# Patient Record
Sex: FEMALE | Race: BLACK OR AFRICAN AMERICAN | Employment: FULL TIME | URBAN - METROPOLITAN AREA
[De-identification: names, ages, dates, MRNs, and addresses within clinical notes are randomized per-mention and may not be internally consistent; named-entity substitution may affect disease eponyms.]

---

## 2017-03-01 ENCOUNTER — OFFICE VISIT (OUTPATIENT)
Dept: FAMILY MEDICINE CLINIC | Facility: CLINIC | Age: 37
End: 2017-03-01

## 2017-03-01 VITALS
BODY MASS INDEX: 34.84 KG/M2 | DIASTOLIC BLOOD PRESSURE: 78 MMHG | HEIGHT: 67 IN | TEMPERATURE: 98.3 F | RESPIRATION RATE: 16 BRPM | WEIGHT: 222 LBS | SYSTOLIC BLOOD PRESSURE: 128 MMHG | HEART RATE: 87 BPM | OXYGEN SATURATION: 97 %

## 2017-03-01 DIAGNOSIS — G56.21 ULNAR NEUROPATHY AT ELBOW OF RIGHT UPPER EXTREMITY: ICD-10-CM

## 2017-03-01 DIAGNOSIS — G56.01 CARPAL TUNNEL SYNDROME OF RIGHT WRIST: ICD-10-CM

## 2017-03-01 DIAGNOSIS — Z01.419 WELL WOMAN EXAM WITH ROUTINE GYNECOLOGICAL EXAM: Primary | ICD-10-CM

## 2017-03-01 DIAGNOSIS — Z11.3 SCREENING FOR STD (SEXUALLY TRANSMITTED DISEASE): ICD-10-CM

## 2017-03-01 DIAGNOSIS — Z12.4 SCREENING FOR MALIGNANT NEOPLASM OF CERVIX: ICD-10-CM

## 2017-03-01 NOTE — PATIENT INSTRUCTIONS
Well Visit, Ages 25 to 48: Care Instructions  Your Care Instructions  Physical exams can help you stay healthy. Your doctor has checked your overall health and may have suggested ways to take good care of yourself. He or she also may have recommended tests. At home, you can help prevent illness with healthy eating, regular exercise, and other steps. Follow-up care is a key part of your treatment and safety. Be sure to make and go to all appointments, and call your doctor if you are having problems. It's also a good idea to know your test results and keep a list of the medicines you take. How can you care for yourself at home? · Reach and stay at a healthy weight. This will lower your risk for many problems, such as obesity, diabetes, heart disease, and high blood pressure. · Get at least 30 minutes of physical activity on most days of the week. Walking is a good choice. You also may want to do other activities, such as running, swimming, cycling, or playing tennis or team sports. Discuss any changes in your exercise program with your doctor. · Do not smoke or allow others to smoke around you. If you need help quitting, talk to your doctor about stop-smoking programs and medicines. These can increase your chances of quitting for good. · Talk to your doctor about whether you have any risk factors for sexually transmitted infections (STIs). Having one sex partner (who does not have STIs and does not have sex with anyone else) is a good way to avoid these infections. · Use birth control if you do not want to have children at this time. Talk with your doctor about the choices available and what might be best for you. · Protect your skin from too much sun. When you're outdoors from 10 a.m. to 4 p.m., stay in the shade or cover up with clothing and a hat with a wide brim. Wear sunglasses that block UV rays. Even when it's cloudy, put broad-spectrum sunscreen (SPF 30 or higher) on any exposed skin.   · See a dentist one or two times a year for checkups and to have your teeth cleaned. · Wear a seat belt in the car. · Drink alcohol in moderation, if at all. That means no more than 2 drinks a day for men and 1 drink a day for women. Follow your doctor's advice about when to have certain tests. These tests can spot problems early. For everyone  · Cholesterol. Have the fat (cholesterol) in your blood tested after age 21. Your doctor will tell you how often to have this done based on your age, family history, or other things that can increase your risk for heart disease. · Blood pressure. Have your blood pressure checked during a routine doctor visit. Your doctor will tell you how often to check your blood pressure based on your age, your blood pressure results, and other factors. · Vision. Talk with your doctor about how often to have a glaucoma test.  · Diabetes. Ask your doctor whether you should have tests for diabetes. · Colon cancer. Have a test for colon cancer at age 48. You may have one of several tests. If you are younger than 48, you may need a test earlier if you have any risk factors. Risk factors include whether you already had a precancerous polyp removed from your colon or whether your parent, brother, sister, or child has had colon cancer. For women  · Breast exam and mammogram. Talk to your doctor about when you should have a clinical breast exam and a mammogram. Medical experts differ on whether and how often women under 50 should have these tests. Your doctor can help you decide what is right for you. · Pap test and pelvic exam. Begin Pap tests at age 24. A Pap test is the best way to find cervical cancer. The test often is part of a pelvic exam. Ask how often to have this test.  · Tests for sexually transmitted infections (STIs). Ask whether you should have tests for STIs. You may be at risk if you have sex with more than one person, especially if your partners do not wear condoms.   For men  · Tests for sexually transmitted infections (STIs). Ask whether you should have tests for STIs. You may be at risk if you have sex with more than one person, especially if you do not wear a condom. · Testicular cancer exam. Ask your doctor whether you should check your testicles regularly. · Prostate exam. Talk to your doctor about whether you should have a blood test (called a PSA test) for prostate cancer. Experts differ on whether and when men should have this test. Some experts suggest it if you are older than 39 and are -American or have a father or brother who got prostate cancer when he was younger than 72. When should you call for help? Watch closely for changes in your health, and be sure to contact your doctor if you have any problems or symptoms that concern you. Where can you learn more? Go to http://brittany-randa.info/. Enter P072 in the search box to learn more about \"Well Visit, Ages 25 to 48: Care Instructions. \"  Current as of: July 19, 2016  Content Version: 11.1  © 3653-4464 [employer]. Care instructions adapted under license by [employer] (which disclaims liability or warranty for this information). If you have questions about a medical condition or this instruction, always ask your healthcare professional. [place] [address] any warranty or liability for your use of this information. Pap Test: Care Instructions  Your Care Instructions  The Pap test (also called a Pap smear) is a screening test for cancer of the cervix, which is the lower part of the uterus that opens into the vagina. The test can help your doctor find early changes in the cells that could lead to cancer. The sample of cells taken during your test has been sent to a lab so that an expert can look at the cells. It usually takes a week or two to get the results back. Follow-up care is a key part of your treatment and safety.  Be sure to make and go to all appointments, and call your doctor if you are having problems. It's also a good idea to know your test results and keep a list of the medicines you take. What do the results mean? · A normal result means that the test did not find any abnormal cells in the sample. · An abnormal result can mean many things. Most of these are not cancer. The results of your test may be abnormal because:  ¨ You have an infection of the vagina or cervix, such as a yeast infection. ¨ You have an IUD (intrauterine device for birth control). ¨ You have low estrogen levels after menopause that are causing the cells to change. ¨ You have cell changes that may be a sign of precancer or cancer. The results are ranked based on how serious the changes might be. There are many other reasons why you might not get a normal result. If the results were abnormal, you may need to get another test within a few weeks or months. If the results show changes that could be a sign of cancer, you may need a test called a colposcopy, which provides a more complete view of the cervix. Sometimes the lab cannot use the sample because it does not contain enough cells or was not preserved well. If so, you may need to have the test again. This is not common, but it does happen from time to time. When should you call for help? Watch closely for changes in your health, and be sure to contact your doctor if:  · You have vaginal bleeding or pain for more than 2 days after the test. It is normal to have a small amount of bleeding for a day or two after the test.  Where can you learn more? Go to http://brittany-randa.info/. Enter Y576 in the search box to learn more about \"Pap Test: Care Instructions. \"  Current as of: July 26, 2016  Content Version: 11.1  © 6798-9144 [employer]. Care instructions adapted under license by [employer] (which disclaims liability or warranty for this information).  If you have questions about a medical condition or this instruction, always ask your healthcare professional. [place] [address] any warranty or liability for your use of this information.

## 2017-03-01 NOTE — PROGRESS NOTES
Chief Complaint   Patient presents with    Well Woman    Complete Physical       Subjective:   40 y.o. female for Well Woman Check. No LMP recorded. Patient is not currently having periods (Reason: IUD). Social History: single partner, contraception - IUD. Pertinent past medical hstory: no history of HTN, DVT, CAD, DM, liver disease, migraines or smoking. Planning to see OB as Mirena is due to be changed  Mammogram being offered at job. Patient plans to get one. Hx of aunt and grandmother with breast cancer    Gets numbness and tingling at wrist and elbow; can occur at night with placing hands underneath chin or behind head. Other times irritated when cooking, doing hair or any/some repetitive motion. Not currently dropping things. Denies pain,f,c,n,v,d,c, muscle aches.     Patient Active Problem List   Diagnosis Code    Morbid obesity due to excess calories ([facility] [address] E66.01     Patient Active Problem List    Diagnosis Date Noted    Morbid obesity due to excess calories ([facility] 08/10/2016     Current Outpatient Prescriptions   Medication Sig Dispense Refill    levonorgestrel (MIRENA) 20 mcg/24 hr (5 years) IUD 1 Each by IntraUTERine route once.  HYDROcodone-acetaminophen (NORCO) 5-325 mg per tablet Take 1 Tab by mouth every four (4) hours as needed for Pain. Max Daily Amount: 6 Tabs. 12 Tab 0    triamcinolone acetonide (KENALOG) 0.1 % ointment Apply  to affected area two (2) times a day. use thin layer 30 g 0    Arm Brace (NEOPRENE WRIST SPLINT SUPPORT) misc Use during day as needed 1 Each 0    Arm Brace (ELBOW STRAP) misc Use nightly 1 Each 0     No Known Allergies  No past medical history on file. No past surgical history on file. Family History   Problem Relation Age of Onset    Diabetes Mother     Diabetes Father     Hypertension Father      Social History   Substance Use Topics    Smoking status: Former Smoker    Smokeless tobacco: Never Used    Alcohol use No        ROS:  Feeling well.  No dyspnea or chest pain on exertion. No abdominal pain, change in bowel habits, black or bloody stools. No urinary tract symptoms. GYN ROS: normal menses, no abnormal bleeding, pelvic pain or discharge, no breast pain or new or enlarging lumps on self exam. No neurological complaints. Objective:     Visit Vitals    /78    Pulse 87    Temp 98.3 °F (36.8 °C)    Resp 16    Ht 5' 7\" (1.702 m)    Wt 222 lb (100.7 kg)    SpO2 97%    BMI 34.77 kg/m2     The patient appears well, alert, oriented x 3, in no distress. ENT normal.  Neck supple. No adenopathy or thyromegaly. JAZMIN. Lungs are clear, good air entry, no wheezes, rhonchi or rales. S1 and S2 normal, no murmurs, regular rate and rhythm. Abdomen soft without tenderness, guarding, mass or organomegaly. Extremities show no edema, normal peripheral pulses. Neurological is normal, no focal findings. BREAST EXAM: breasts appear normal, no suspicious masses, no skin or nipple changes or axillary nodes    PELVIC EXAM: normal external genitalia, vulva, vagina, cervix, uterus and adnexa    Assessment/Plan:   well woman  no contraindication to continue hormonal therapy  mammogram  pap smear  counseled on breast self exam, mammography screening, STD prevention, HIV risk factors and prevention, family planning choices and adequate intake of calcium and vitamin D  return annually or prn  Atrice was seen today for well woman and complete physical.    Diagnoses and all orders for this visit:    Well woman exam with routine gynecological exam  Comments:  [L42.31]  Orders:  -     LIPID PANEL; Future  -     METABOLIC PANEL, COMPREHENSIVE; Future  -     CBC WITH AUTOMATED DIFF; Future  -     TSH 3RD GENERATION; Future    Screening for malignant neoplasm of cervix  -     PAP, LB, RFX HPV OABFI(712111); Future    Screening for STD (sexually transmitted disease)  -     RPR; Future  -     HIV 1/2 AG/AB, 4TH GENERATION,W RFLX CONFIRM;  Future  -     HEP B SURFACE AG; Future    Ulnar neuropathy at elbow of right upper extremity  -     REFERRAL TO PHYSICAL THERAPY  -     REFERRAL TO ORTHOPEDICS    Carpal tunnel syndrome of right wrist  -     REFERRAL TO PHYSICAL THERAPY  -     REFERRAL TO ORTHOPEDICS    Will send to PT and Ortho for persistent right wrist/hand neuropathy    I have discussed the diagnosis with the patient and the intended plan as seen in the above orders. The patient has received an after-visit summary and questions were answered concerning future plans. I have discussed medication side effects and warnings with the patient as well. I have reviewed the plan of care with the patient, accepted their input and they are in agreement with the treatment goals. Patient verbalizes understanding. Follow-up Disposition:  Return in about 1 year (around 3/1/2018). Mariaa Acuna

## 2017-03-01 NOTE — MR AVS SNAPSHOT
Visit Information Date & Time Provider Department Dept. Phone Encounter #  
 3/1/2017  8:30 AM Mal Mojica MD [facility] [phone] [id number] Follow-up Instructions Return in about 1 year (around 3/1/2018). Upcoming Health Maintenance Date Due DTaP/Tdap/Td series (1 - Tdap) 1/23/2001 PAP AKA CERVICAL CYTOLOGY 6/7/2014 INFLUENZA AGE 9 TO ADULT 8/1/2016 Allergies as of 3/1/2017  Review Complete On: 3/1/2017 By: Marilin Putnam No Known Allergies Current Immunizations  Never Reviewed No immunizations on file. Not reviewed this visit You Were Diagnosed With   
  
 Codes Comments Screening for STD (sexually transmitted disease)    -  Primary ICD-10-CM: Z11.3 ICD-9-CM: V74.5 Well woman exam with routine gynecological exam     ICD-10-CM: K14.191 ICD-9-CM: V72.31 [V72.31] Screening for malignant neoplasm of cervix     ICD-10-CM: Z12.4 ICD-9-CM: V76.2 Vitals BP  
  
  
  
  
  
 128/78 Vitals History BMI and BSA Data Body Mass Index Body Surface Area 34.77 kg/m 2 2.18 m 2 Preferred Pharmacy Pharmacy Name Phone [address] [facility], [address] Your Updated Medication List  
  
   
This list is accurate as of: 3/1/17  8:51 AM.  Always use your most recent med list.  
  
  
  
  
 * Arm Brace Misc Commonly known as:  NEOPRENE WRIST SPLINT SUPPORT Use during day as needed * Arm Brace Misc Commonly known as:  ELBOW STRAP Use nightly HYDROcodone-acetaminophen 5-325 mg per tablet Commonly known as:  Rich Rios Take 1 Tab by mouth every four (4) hours as needed for Pain. Max Daily Amount: 6 Tabs. MIRENA 20 mcg/24 hr (5 years) IUD Generic drug:  levonorgestrel 1 Each by IntraUTERine route once. triamcinolone acetonide 0.1 % ointment Commonly known as:  KENALOG Apply  to affected area two (2) times a day. use thin layer * Notice: This list has 2 medication(s) that are the same as other medications prescribed for you. Read the directions carefully, and ask your doctor or other care provider to review them with you. Follow-up Instructions Return in about 1 year (around 3/1/2018). To-Do List   
 03/01/2017 Lab:  CBC WITH AUTOMATED DIFF   
  
 03/01/2017 Lab:  HEP B SURFACE AG   
  
 03/01/2017 Lab:  HIV 1/2 AG/AB, 4TH GENERATION,W RFLX CONFIRM   
  
 03/01/2017 Lab:  LIPID PANEL   
  
 03/01/2017 Lab:  METABOLIC PANEL, COMPREHENSIVE   
  
 03/01/2017 Pathology:  PAP, LB, RFX HPV [id number])   
  
 03/01/2017 Lab:  RPR   
  
 03/01/2017 Lab:  TSH 3RD GENERATION Patient Instructions Well Visit, Ages 25 to 48: Care Instructions Your Care Instructions Physical exams can help you stay healthy. Your doctor has checked your overall health and may have suggested ways to take good care of yourself. He or she also may have recommended tests. At home, you can help prevent illness with healthy eating, regular exercise, and other steps. Follow-up care is a key part of your treatment and safety. Be sure to make and go to all appointments, and call your doctor if you are having problems. It's also a good idea to know your test results and keep a list of the medicines you take. How can you care for yourself at home? · Reach and stay at a healthy weight. This will lower your risk for many problems, such as obesity, diabetes, heart disease, and high blood pressure. · Get at least 30 minutes of physical activity on most days of the week. Walking is a good choice. You also may want to do other activities, such as running, swimming, cycling, or playing tennis or team sports. Discuss any changes in your exercise program with your doctor. · Do not smoke or allow others to smoke around you.  If you need help quitting, talk to your doctor about stop-smoking programs and medicines. These can increase your chances of quitting for good. · Talk to your doctor about whether you have any risk factors for sexually transmitted infections (STIs). Having one sex partner (who does not have STIs and does not have sex with anyone else) is a good way to avoid these infections. · Use birth control if you do not want to have children at this time. Talk with your doctor about the choices available and what might be best for you. · Protect your skin from too much sun. When you're outdoors from 10 a.m. to 4 p.m., stay in the shade or cover up with clothing and a hat with a wide brim. Wear sunglasses that block UV rays. Even when it's cloudy, put broad-spectrum sunscreen (SPF 30 or higher) on any exposed skin. · See a dentist one or two times a year for checkups and to have your teeth cleaned. · Wear a seat belt in the car. · Drink alcohol in moderation, if at all. That means no more than 2 drinks a day for men and 1 drink a day for women. Follow your doctor's advice about when to have certain tests. These tests can spot problems early. For everyone · Cholesterol. Have the fat (cholesterol) in your blood tested after age 21. Your doctor will tell you how often to have this done based on your age, family history, or other things that can increase your risk for heart disease. · Blood pressure. Have your blood pressure checked during a routine doctor visit. Your doctor will tell you how often to check your blood pressure based on your age, your blood pressure results, and other factors. · Vision. Talk with your doctor about how often to have a glaucoma test. 
· Diabetes. Ask your doctor whether you should have tests for diabetes. · Colon cancer. Have a test for colon cancer at age 48. You may have one of several tests.  If you are younger than 48, you may need a test earlier if you have any risk factors. Risk factors include whether you already had a precancerous polyp removed from your colon or whether your parent, brother, sister, or child has had colon cancer. For women · Breast exam and mammogram. Talk to your doctor about when you should have a clinical breast exam and a mammogram. Medical experts differ on whether and how often women under 50 should have these tests. Your doctor can help you decide what is right for you. · Pap test and pelvic exam. Begin Pap tests at age 24. A Pap test is the best way to find cervical cancer. The test often is part of a pelvic exam. Ask how often to have this test. 
· Tests for sexually transmitted infections (STIs). Ask whether you should have tests for STIs. You may be at risk if you have sex with more than one person, especially if your partners do not wear condoms. For men · Tests for sexually transmitted infections (STIs). Ask whether you should have tests for STIs. You may be at risk if you have sex with more than one person, especially if you do not wear a condom. · Testicular cancer exam. Ask your doctor whether you should check your testicles regularly. · Prostate exam. Talk to your doctor about whether you should have a blood test (called a PSA test) for prostate cancer. Experts differ on whether and when men should have this test. Some experts suggest it if you are older than 39 and are -American or have a father or brother who got prostate cancer when he was younger than 72. When should you call for help? Watch closely for changes in your health, and be sure to contact your doctor if you have any problems or symptoms that concern you. Where can you learn more? Go to http://brittany-randa.info/. Enter P072 in the search box to learn more about \"Well Visit, Ages 25 to 48: Care Instructions. \" Current as of: July 19, 2016 Content Version: 11.1 © 6170-9776 VouchedFor. Care instructions adapted under license by [employer] (which disclaims liability or warranty for this information). If you have questions about a medical condition or this instruction, always ask your healthcare professional. [address] any warranty or liability for your use of this information. Pap Test: Care Instructions Your Care Instructions The Pap test (also called a Pap smear) is a screening test for cancer of the cervix, which is the lower part of the uterus that opens into the vagina. The test can help your doctor find early changes in the cells that could lead to cancer. The sample of cells taken during your test has been sent to a lab so that an expert can look at the cells. It usually takes a week or two to get the results back. Follow-up care is a key part of your treatment and safety. Be sure to make and go to all appointments, and call your doctor if you are having problems. It's also a good idea to know your test results and keep a list of the medicines you take. What do the results mean? · A normal result means that the test did not find any abnormal cells in the sample. · An abnormal result can mean many things. Most of these are not cancer. The results of your test may be abnormal because: 
¨ You have an infection of the vagina or cervix, such as a yeast infection. ¨ You have an IUD (intrauterine device for birth control). ¨ You have low estrogen levels after menopause that are causing the cells to change. ¨ You have cell changes that may be a sign of precancer or cancer. The results are ranked based on how serious the changes might be. There are many other reasons why you might not get a normal result. If the results were abnormal, you may need to get another test within a few weeks or months.  If the results show changes that could be a sign of cancer, you may need a test called a colposcopy, which provides a more complete view of the cervix. Sometimes the lab cannot use the sample because it does not contain enough cells or was not preserved well. If so, you may need to have the test again. This is not common, but it does happen from time to time. When should you call for help? Watch closely for changes in your health, and be sure to contact your doctor if: 
· You have vaginal bleeding or pain for more than 2 days after the test. It is normal to have a small amount of bleeding for a day or two after the test. 
Where can you learn more? Go to http://brittany-randa.info/. Enter D266 in the search box to learn more about \"Pap Test: Care Instructions. \" Current as of: July 26, 2016 Content Version: 11.1 © 4967-0024 Colppy. Care instructions adapted under license by [employer] (which disclaims liability or warranty for this information). If you have questions about a medical condition or this instruction, always ask your healthcare professional. [place] [address] any warranty or liability for your use of this information. Introducing [facility]! Jason Rivers introduces Survata patient portal. Now you can access parts of your medical record, email your doctor's office, and request medication refills online. 1. In your internet browser, go to [email or web address] 2. Click on the First Time User? Click Here link in the Sign In box. You will see the New Member Sign Up page. 3. Enter your Survata Access Code exactly as it appears below. You will not need to use this code after youve completed the sign-up process. If you do not sign up before the expiration date, you must request a new code. · Survata Access Code: [email or web address] Expires: 5/30/2017  8:51 AM 
 
4.  Enter the last four digits of your Social Security Number (xxxx) and Date of Birth (mm/dd/yyyy) as indicated and click Submit. You will be taken to the next sign-up page. 5. Create a Publicate ID. This will be your Publicate login ID and cannot be changed, so think of one that is secure and easy to remember. 6. Create a Publicate password. You can change your password at any time. 7. Enter your Password Reset Question and Answer. This can be used at a later time if you forget your password. 8. Enter your e-mail address. You will receive e-mail notification when new information is available in [address] 9. Click Sign Up. You can now view and download portions of your medical record. 10. Click the Download Summary menu link to download a portable copy of your medical information. If you have questions, please visit the Frequently Asked Questions section of the Publicate website. Remember, Publicate is NOT to be used for urgent needs. For medical emergencies, dial 911. Now available from your iPhone and Android! Please provide this summary of care documentation to your next provider. Your primary care clinician is listed as NONE. If you have any questions after today's visit, please call [phone].

## 2017-03-01 NOTE — LETTER
NOTIFICATION RETURN TO WORK / SCHOOL 
 
3/1/2017 9:10 AM 
 
Ms. Nghia Brown [address] [facility] [address] To Whom It May Concern: 
 
Nghia Brown is currently under the care of Sheng Porter. She will return to work/school on: 3/2/2017 If there are questions or concerns please have the patient contact our office.  
 
 
 
Sincerely, 
 
 
Donna Escamilla MD

## 2017-03-04 LAB
CYTOLOGIST CVX/VAG CYTO: NORMAL
DX ICD CODE: NORMAL
DX ICD CODE: NORMAL
LABCORP, 190119: NORMAL
Lab: NORMAL
OTHER STN SPEC: NORMAL
PATH REPORT.FINAL DX SPEC: NORMAL
STAT OF ADQ CVX/VAG CYTO-IMP: NORMAL

## 2017-03-06 DIAGNOSIS — A59.9 TRICHOMONAS INFECTION: Primary | ICD-10-CM

## 2017-03-06 RX ORDER — METRONIDAZOLE 500 MG/1
2000 TABLET ORAL ONCE
Qty: 4 TAB | Refills: 0 | Status: SHIPPED | OUTPATIENT
Start: 2017-03-06 | End: 2017-03-06

## 2017-03-06 NOTE — PROGRESS NOTES
Pap smear is unremarkable for cancerous lesions. Patient has trichomonas which is an STD. Please abstain from sex while on Treatment. I have sent treatment to your pharmacy.

## 2017-03-06 NOTE — PROGRESS NOTES
Patient made aware of normal pap results an trichomonas ,STD , abstain from sex while on treatment (sent to pharmacy). Verified name and . Patient verbalized an understanding of results and did not voice any concerns at this time.

## 2017-03-07 ENCOUNTER — TELEPHONE (OUTPATIENT)
Dept: FAMILY MEDICINE CLINIC | Facility: CLINIC | Age: 37
End: 2017-03-07

## 2018-03-06 ENCOUNTER — OFFICE VISIT (OUTPATIENT)
Dept: FAMILY MEDICINE CLINIC | Facility: CLINIC | Age: 38
End: 2018-03-06

## 2018-03-06 VITALS
BODY MASS INDEX: 34.84 KG/M2 | WEIGHT: 222 LBS | DIASTOLIC BLOOD PRESSURE: 80 MMHG | RESPIRATION RATE: 14 BRPM | HEIGHT: 67 IN | TEMPERATURE: 97.9 F | SYSTOLIC BLOOD PRESSURE: 126 MMHG | HEART RATE: 75 BPM | OXYGEN SATURATION: 98 %

## 2018-03-06 DIAGNOSIS — T78.40XA ALLERGIC REACTION, INITIAL ENCOUNTER: ICD-10-CM

## 2018-03-06 DIAGNOSIS — R21 RASH: Primary | ICD-10-CM

## 2018-03-06 RX ORDER — TRIAMCINOLONE ACETONIDE 1 MG/G
OINTMENT TOPICAL 2 TIMES DAILY
Qty: 30 G | Refills: 2 | Status: ON HOLD | OUTPATIENT
Start: 2018-03-06 | End: 2020-08-10

## 2018-03-06 NOTE — MR AVS SNAPSHOT
[address] [facility] 
 
 
 [address] [facility] [address] [facility] [address] 
[phone] Patient: Arelis Anguiano MRN: [id number] BPC:[id number] Visit Information Date & Time Provider Department Dept. Phone Encounter #  
 3/6/2018 12:45 PM Camila Mcknight NP [facility] [phone] [id number] Follow-up Instructions Return if symptoms worsen or fail to improve. Upcoming Health Maintenance Date Due DTaP/Tdap/Td series (1 - Tdap) 1/23/2001 Influenza Age 5 to Adult 8/1/2017 PAP AKA CERVICAL CYTOLOGY 3/1/2020 Allergies as of 3/6/2018  Review Complete On: 3/6/2018 By: Sourav Lund Severity Noted Reaction Type Reactions Nitrile High 03/06/2018    Itching, Swelling Gloves- bumps, peeling of skin Current Immunizations  Never Reviewed No immunizations on file. Not reviewed this visit You Were Diagnosed With   
  
 Codes Comments Rash    -  Primary ICD-10-CM: R21 
ICD-9-CM: 782.1 Allergic reaction, initial encounter     ICD-10-CM: T78.40XA ICD-9-CM: 629. 3 Vitals BP Pulse Temp Resp Height(growth percentile) Weight(growth percentile) 126/80 75 97.9 °F (36.6 °C) 14 5' 7\" (1.702 m) 222 lb (100.7 kg) SpO2 BMI OB Status Smoking Status 98% 34.77 kg/m2 IUD Former Smoker Vitals History BMI and BSA Data Body Mass Index Body Surface Area 34.77 kg/m 2 2.18 m 2 Preferred Pharmacy Pharmacy Name Phone [address] [facility], [address] [place] [address] Your Updated Medication List  
  
   
This list is accurate as of 3/6/18  1:12 PM.  Always use your most recent med list.  
  
  
  
  
 Arm Brace Misc Commonly known as:  NEOPRENE WRIST SPLINT SUPPORT Use during day as needed MIRENA 20 mcg/24 hr (5 years) Iud  
Generic drug:  levonorgestrel 1 Each by IntraUTERine route once. triamcinolone acetonide 0.1 % ointment Commonly known as:  KENALOG Apply  to affected area two (2) times a day. use thin layer Prescriptions Sent to Pharmacy Refills  
 triamcinolone acetonide (KENALOG) 0.1 % ointment 2 Sig: Apply  to affected area two (2) times a day. use thin layer Class: Normal  
 Pharmacy: [address] [place] [address] #: [phone] Route: Topical  
  
Follow-up Instructions Return if symptoms worsen or fail to improve. Patient Instructions Allergic Reaction: Care Instructions Your Care Instructions An allergic reaction is an excessive response from your immune system to a medicine, chemical, food, insect bite, or other substance. A reaction can range from mild to life-threatening. Some people have a mild rash, hives, and itching or stomach cramps. In severe reactions, swelling of your tongue and throat can close up your airway so that you cannot breathe. Follow-up care is a key part of your treatment and safety. Be sure to make and go to all appointments, and call your doctor if you are having problems. It's also a good idea to know your test results and keep a list of the medicines you take. How can you care for yourself at home? · If you know what caused your allergic reaction, be sure to avoid it. Your allergy may become more severe each time you have a reaction. · Take an over-the-counter antihistamine, such as cetirizine (Zyrtec) or loratadine (Claritin), to treat mild symptoms. Read and follow directions on the label. Some antihistamines can make you feel sleepy. Do not give antihistamines to a child unless you have checked with your doctor first. Mild symptoms include sneezing or an itchy or runny nose; an itchy mouth; a few hives or mild itching; and mild nausea or stomach discomfort. · Do not scratch hives or a rash.  Put a cold, moist towel on them or take cool baths to relieve itching. Put ice packs on hives, swelling, or insect stings for 10 to 15 minutes at a time. Put a thin cloth between the ice pack and your skin. Do not take hot baths or showers. They will make the itching worse. · Your doctor may prescribe a shot of epinephrine to carry with you in case you have a severe reaction. Learn how to give yourself the shot and keep it with you at all times. Make sure it is not . · Go to the emergency room every time you have a severe reaction, even if you have used your shot of epinephrine and are feeling better. Symptoms can come back after a shot. · Wear medical alert jewelry that lists your allergies. You can buy this at most [employer]. · If your child has a severe allergy, make sure that his or her teachers, babysitters, coaches, and other caregivers know about the allergy. They should have an epinephrine shot, know how and when to give it, and have a plan to take your child to the hospital. 
When should you call for help? Give an epinephrine shot if: 
? · You think you are having a severe allergic reaction. ? · You have symptoms in more than one body area, such as mild nausea and an itchy mouth. ? After giving an epinephrine shot call 911, even if you feel better. ?Call 911 if: 
? · You have symptoms of a severe allergic reaction. These may include: 
¨ Sudden raised, red areas (hives) all over your body. ¨ Swelling of the throat, mouth, lips, or tongue. ¨ Trouble breathing. ¨ Passing out (losing consciousness). Or you may feel very lightheaded or suddenly feel weak, confused, or restless. ? · You have been given an epinephrine shot, even if you feel better. ?Call your doctor now or seek immediate medical care if: 
? · You have symptoms of an allergic reaction, such as: ¨ A rash or hives (raised, red areas on the skin). ¨ Itching. ¨ Swelling. ¨ Belly pain, nausea, or vomiting. ?Watch closely for changes in your health, and be sure to contact your doctor if: 
? · You do not get better as expected. Where can you learn more? Go to http://brittany-randa.info/. Enter K609 in the search box to learn more about \"Allergic Reaction: Care Instructions. \" Current as of: September 29, 2016 Content Version: 11.4 © 7507-0686 Blippar. Care instructions adapted under license by [employer] (which disclaims liability or warranty for this information). If you have questions about a medical condition or this instruction, always ask your healthcare professional. [place] [address] any warranty or liability for your use of this information. Introducing [address]! Dustin Campos introduces Dial2Do patient portal. Now you can access parts of your medical record, email your doctor's office, and request medication refills online. 1. In your internet browser, go to [email or web address] 2. Click on the First Time User? Click Here link in the Sign In box. You will see the New Member Sign Up page. 3. Enter your Dial2Do Access Code exactly as it appears below. You will not need to use this code after youve completed the sign-up process. If you do not sign up before the expiration date, you must request a new code. · Dial2Do Access Code: [email or web address] Expires: 6/4/2018  1:12 PM 
 
4. Enter the last four digits of your Social Security Number (xxxx) and Date of Birth (mm/dd/yyyy) as indicated and click Submit. You will be taken to the next sign-up page. 5. Create a Dial2Do ID. This will be your Dial2Do login ID and cannot be changed, so think of one that is secure and easy to remember. 6. Create a Dial2Do password. You can change your password at any time. 7. Enter your Password Reset Question and Answer. This can be used at a later time if you forget your password. 8. Enter your e-mail address. You will receive e-mail notification when new information is available in [address] 9. Click Sign Up. You can now view and download portions of your medical record. 10. Click the Download Summary menu link to download a portable copy of your medical information. If you have questions, please visit the Frequently Asked Questions section of the Zingaya website. Remember, Zingaya is NOT to be used for urgent needs. For medical emergencies, dial 911. Now available from your iPhone and Android! Please provide this summary of care documentation to your next provider. Your primary care clinician is listed as NONE. If you have any questions after today's visit, please call [phone].

## 2018-03-06 NOTE — PROGRESS NOTES
HISTORY OF PRESENT ILLNESS  Teo Flores is a 45 y.o. female. HPI Comments: Acute care visit with c/o right hand rash and swelling after coming in contact with nitrile gloves at work. Reports this has happed before and Triamcinolone ointment usually work. She is requesting a refill today. Hand Swelling    The history is provided by the patient. This is a new problem. The current episode started 2 days ago. The problem occurs constantly. The problem has not changed since onset. The pain is present in the right hand. The patient is experiencing no pain. Treatments tried: Triamcinolone. The treatment provided significant relief. There has been no history of extremity trauma. Review of Systems   Constitutional: Negative. Respiratory: Negative. Cardiovascular: Negative. Genitourinary: Negative. Skin: Positive for rash (right hand). Endo/Heme/Allergies: Negative. No past medical history on file. No past surgical history on file. Current Outpatient Prescriptions on File Prior to Visit   Medication Sig Dispense Refill    levonorgestrel (MIRENA) 20 mcg/24 hr (5 years) IUD 1 Each by IntraUTERine route once.  Arm Brace (NEOPRENE WRIST SPLINT SUPPORT) misc Use during day as needed 1 Each 0     No current facility-administered medications on file prior to visit. Allergies and Intolerances: Allergies   Allergen Reactions    Nitrile Itching and Swelling     Gloves- bumps, peeling of skin       Family History:   Family History   Problem Relation Age of Onset    Diabetes Mother     Diabetes Father     Hypertension Father        Social History:   She  reports that she has quit smoking. She has never used smokeless tobacco. She  reports that she does not drink alcohol.   Vitals:   Visit Vitals    /80    Pulse 75    Temp 97.9 °F (36.6 °C)    Resp 14    Ht 5' 7\" (1.702 m)    Wt 222 lb (100.7 kg)    SpO2 98%    BMI 34.77 kg/m2     Body surface area is 2.18 meters squared. Physical Exam   Constitutional: She is oriented to person, place, and time. She appears well-developed and well-nourished. Cardiovascular: Normal rate. Pulmonary/Chest: Effort normal.   Neurological: She is alert and oriented to person, place, and time. Skin: Rash (right hand) noted. Psychiatric: She has a normal mood and affect. Her behavior is normal.   Nursing note and vitals reviewed. ASSESSMENT and PLAN    ICD-10-CM ICD-9-CM    1. Rash R21 782.1 triamcinolone acetonide (KENALOG) 0.1 % ointment   2. Allergic reaction, initial encounter T78.40XA 995.3 triamcinolone acetonide (KENALOG) 0.1 % ointment     Follow-up Disposition:  Return if symptoms worsen or fail to improve. reviewed medications and side effects in detail    - Alarm signals discussed. ER precautions  - Plan of care reviewed with patient. Understanding verbalized and they are in agreement with plan of care.

## 2018-03-06 NOTE — PATIENT INSTRUCTIONS

## 2018-03-06 NOTE — LETTER
NOTIFICATION RETURN TO WORK / SCHOOL 
 
3/6/2018 1:13 PM 
 
Ms. Cr Adams [address] [place] [id number] To Whom It May Concern: 
 
Cr Adams was seen at  [address] [facility] on 3/6/18 She will return to work on 3/8/18 If there are questions or concerns please have the patient contact our office.  
 
 
 
Sincerely, 
 
 
Criss Buerger, NP

## 2018-03-29 ENCOUNTER — HOSPITAL ENCOUNTER (EMERGENCY)
Age: 38
Discharge: HOME | End: 2018-03-30
Attending: EMERGENCY MEDICINE | Admitting: EMERGENCY MEDICINE
Payer: COMMERCIAL

## 2018-03-29 VITALS
DIASTOLIC BLOOD PRESSURE: 82 MMHG | RESPIRATION RATE: 18 BRPM | OXYGEN SATURATION: 100 % | BODY MASS INDEX: 32.96 KG/M2 | HEART RATE: 97 BPM | HEIGHT: 67 IN | SYSTOLIC BLOOD PRESSURE: 138 MMHG | TEMPERATURE: 99 F | WEIGHT: 210 LBS

## 2018-03-29 DIAGNOSIS — K08.89 TOOTHACHE: Primary | ICD-10-CM

## 2018-03-29 PROCEDURE — 99283 EMERGENCY DEPT VISIT LOW MDM: CPT

## 2018-03-29 NOTE — LETTER
NOTIFICATION OF RETURN TO WORK / SCHOOL 
 
3/30/2018 12:54 AM 
 
Ms. Barry Adams [address] [facility] [address] [place] To Whom It May Concern: 
 
Barry Adams was under the care of [facility] EMERGENCY DEPT please excuse from work 03/29/2018 and 03/30/2018 If there are questions or concerns please have the patient contact our office. Sincerely, Carine Sotelo RN

## 2018-03-30 PROCEDURE — 74011250637 HC RX REV CODE- 250/637: Performed by: NURSE PRACTITIONER

## 2018-03-30 RX ORDER — PENICILLIN V POTASSIUM 500 MG/1
500 TABLET, FILM COATED ORAL 4 TIMES DAILY
Qty: 40 TAB | Refills: 0 | Status: SHIPPED | OUTPATIENT
Start: 2018-03-30 | End: 2018-04-09

## 2018-03-30 RX ORDER — IBUPROFEN 800 MG/1
800 TABLET ORAL
Qty: 20 TAB | Refills: 0 | Status: SHIPPED | OUTPATIENT
Start: 2018-03-30 | End: 2018-04-06

## 2018-03-30 RX ORDER — PENICILLIN V POTASSIUM 250 MG/1
500 TABLET, FILM COATED ORAL
Status: COMPLETED | OUTPATIENT
Start: 2018-03-30 | End: 2018-03-30

## 2018-03-30 RX ORDER — HYDROCODONE BITARTRATE AND ACETAMINOPHEN 5; 325 MG/1; MG/1
1 TABLET ORAL
Qty: 15 TAB | Refills: 0 | Status: SHIPPED | OUTPATIENT
Start: 2018-03-30 | End: 2018-04-11

## 2018-03-30 RX ORDER — HYDROCODONE BITARTRATE AND ACETAMINOPHEN 5; 325 MG/1; MG/1
2 TABLET ORAL
Status: COMPLETED | OUTPATIENT
Start: 2018-03-30 | End: 2018-03-30

## 2018-03-30 RX ADMIN — PENICILLIN V POTASSIUM 500 MG: 250 TABLET, FILM COATED ORAL at 01:00

## 2018-03-30 RX ADMIN — HYDROCODONE BITARTRATE AND ACETAMINOPHEN 2 TABLET: 5; 325 TABLET ORAL at 01:00

## 2018-03-30 NOTE — ED NOTES
I have reviewed discharge instructions with the patient. The patient verbalized understanding. Patient looks comfortable, left ED in stable condition. Provided with two prescriptions from provider. No acute distress noted. Ambulatory, steady gait noted.

## 2018-03-30 NOTE — ED PROVIDER NOTES
HPI Comments: Pt presents with a toothache no fever reported    Patient is a 45 y.o. female presenting with dental problem. The history is provided by the patient. No  was used. Dental Pain    This is a new problem. The current episode started 12 to 24 hours ago. The problem occurs constantly. The problem has not changed since onset. The pain is located in the right lower mouth. The quality of the pain is aching. The pain is at a severity of 4/10. The pain is mild. There was no fever and no headaches. She has tried nothing for the symptoms. The patient has no cardiac history. No past medical history on file. No past surgical history on file. Family History:   Problem Relation Age of Onset    Diabetes Mother     Diabetes Father     Hypertension Father        Social History     Social History    Marital status: SINGLE     Spouse name: N/A    Number of children: N/A    Years of education: N/A     Occupational History    Not on file. Social History Main Topics    Smoking status: Former Smoker    Smokeless tobacco: Never Used    Alcohol use No    Drug use: Yes     Special: Marijuana    Sexual activity: Yes     Partners: Male     Other Topics Concern    Not on file     Social History Narrative         ALLERGIES: Nitrile    Review of Systems   Constitutional: Negative for fever. HENT: Positive for dental problem. All other systems reviewed and are negative. Vitals:    03/29/18 2256   BP: 138/82   Pulse: 97   Resp: 18   Temp: 99 °F (37.2 °C)   SpO2: 100%   Weight: 95.3 kg (210 lb)   Height: 5' 7\" (1.702 m)            Physical Exam   Constitutional: She is oriented to person, place, and time. She appears well-developed and well-nourished. HENT:   Head: Normocephalic and atraumatic.    Mouth/Throat: Uvula is midline, oropharynx is clear and moist and mucous membranes are normal.       Poor dental hygiene and multiple teeth decayed to gum line, no abscess   Eyes: Conjunctivae and EOM are normal. Pupils are equal, round, and reactive to light. Neck: Normal range of motion. Neck supple. Cardiovascular: Normal rate and regular rhythm. Pulmonary/Chest: Effort normal and breath sounds normal.   Abdominal: Soft. Bowel sounds are normal.   Musculoskeletal: Normal range of motion. Neurological: She is alert and oriented to person, place, and time. She has normal reflexes. Skin: Skin is warm and dry. Psychiatric: She has a normal mood and affect. Her behavior is normal. Judgment and thought content normal.   Nursing note and vitals reviewed. MDM  Number of Diagnoses or Management Options  Toothache: established and improving  Diagnosis management comments: Pt placed on penicillin and pain med,  Dental referral       Amount and/or Complexity of Data Reviewed  Review and summarize past medical records: yes  Independent visualization of images, tracings, or specimens: yes    Risk of Complications, Morbidity, and/or Mortality  Presenting problems: low  Diagnostic procedures: low  Management options: low    Patient Progress  Patient progress: stable        ED Course       Procedures            Vitals:  Patient Vitals for the past 12 hrs:   Temp Pulse Resp BP SpO2   03/29/18 2256 99 °F (37.2 °C) 97 18 138/82 100 %        Reevaluation of patient:   I have reassessed the patient. Patient is feeling better and is asking to go home    Disposition:    Diagnosis:   1. Toothache        Disposition: to Home      Follow-up Information     Follow up With Details Comments [address] In 2 days  [address] [facility] [id number] [phone]           Patient's Medications   Start Taking    HYDROCODONE-ACETAMINOPHEN (NORCO) 5-325 MG PER TABLET    Take 1 Tab by mouth every four (4) hours as needed for Pain. Max Daily Amount: 6 Tabs.     IBUPROFEN (MOTRIN) 800 MG TABLET    Take 1 Tab by mouth every six (6) hours as needed for Pain for up to 7 days. PENICILLIN V POTASSIUM (VEETID) 500 MG TABLET    Take 1 Tab by mouth four (4) times daily for 10 days. Continue Taking    ARM BRACE (NEOPRENE WRIST SPLINT SUPPORT) MISC    Use during day as needed    LEVONORGESTREL (MIRENA) 20 MCG/24 HR (5 YEARS) IUD    1 Each by IntraUTERine route once. TRIAMCINOLONE ACETONIDE (KENALOG) 0.1 % OINTMENT    Apply  to affected area two (2) times a day. use thin layer   These Medications have changed    No medications on file   Stop Taking    No medications on file       Return to the ER if you are unable to obtain referral as directed. Caro Anna's  results have been reviewed with her. She has been counseled regarding her diagnosis, treatment, and plan. She verbally conveys understanding and agreement of the signs, symptoms, diagnosis, treatment and prognosis and additionally agrees to follow up as discussed. She also agrees with the care-plan and conveys that all of her questions have been answered. I have also provided discharge instructions for her that include: educational information regarding their diagnosis and treatment, and list of reasons why they would want to return to the ED prior to their follow-up appointment, should her condition change.         Karla Beatty

## 2018-04-11 ENCOUNTER — OFFICE VISIT (OUTPATIENT)
Dept: FAMILY MEDICINE CLINIC | Facility: CLINIC | Age: 38
End: 2018-04-11

## 2018-04-11 VITALS
OXYGEN SATURATION: 98 % | BODY MASS INDEX: 34.69 KG/M2 | WEIGHT: 221 LBS | TEMPERATURE: 98.2 F | SYSTOLIC BLOOD PRESSURE: 126 MMHG | RESPIRATION RATE: 16 BRPM | HEART RATE: 92 BPM | HEIGHT: 67 IN | DIASTOLIC BLOOD PRESSURE: 71 MMHG

## 2018-04-11 DIAGNOSIS — J02.9 SORE THROAT: Primary | ICD-10-CM

## 2018-04-11 DIAGNOSIS — J02.0 STREP THROAT: ICD-10-CM

## 2018-04-11 LAB
S PYO AG THROAT QL: POSITIVE
VALID INTERNAL CONTROL?: YES

## 2018-04-11 RX ORDER — AMOXICILLIN 500 MG/1
500 CAPSULE ORAL 2 TIMES DAILY
Qty: 20 CAP | Refills: 0 | Status: SHIPPED | OUTPATIENT
Start: 2018-04-11 | End: 2018-04-21

## 2018-04-11 NOTE — MR AVS SNAPSHOT
Angelita Union 
 
 
 [address] [facility] [address] [place] [id number] 
[phone] Patient: Meliza Lewis MRN: [id number] Visit Information Date & Time Provider Department Dept. Phone Encounter #  
 2018 11:45 AM Boyd Osullivan NP [employer] [phone] [id number] Follow-up Instructions Return if symptoms worsen or fail to improve. Upcoming Health Maintenance Date Due DTaP/Tdap/Td series (1 - Tdap) 2001 Influenza Age 5 to Adult 2017 PAP AKA CERVICAL CYTOLOGY 3/1/2020 Allergies as of 2018  Review Complete On: 2018 By: Gissell Anderson Severity Noted Reaction Type Reactions Nitrile High 2018    Itching, Swelling Gloves- bumps, peeling of skin Current Immunizations  Never Reviewed No immunizations on file. Not reviewed this visit You Were Diagnosed With   
  
 Codes Comments Sore throat    -  Primary ICD-10-CM: J02.9 ICD-9-CM: 847 Strep throat     ICD-10-CM: J02.0 ICD-9-CM: 034.0 Vitals OB Status Smoking Status IUD Former Smoker Preferred Pharmacy Pharmacy Name Phone [address], [address] Your Updated Medication List  
  
   
This list is accurate as of 18 12:08 PM.  Always use your most recent med list.  
  
  
  
  
 amoxicillin 500 mg capsule Commonly known as:  AMOXIL Take 1 Cap by mouth two (2) times a day for 10 days. MIRENA 20 mcg/24 hr (5 years) Iud  
Generic drug:  levonorgestrel 1 Each by IntraUTERine route once. triamcinolone acetonide 0.1 % ointment Commonly known as:  KENALOG Apply  to affected area two (2) times a day. use thin layer Prescriptions Sent to Pharmacy  Refills  
 amoxicillin (AMOXIL) 500 mg capsule 0  
 Sig: Take 1 Cap by mouth two (2) times a day for 10 days. Class: Normal  
 Pharmacy: [address] [place] [address], [address] [place] [address] #: [phone] Route: Oral  
  
We Performed the Following AMB POC RAPID STREP A [35669 CPT(R)] Follow-up Instructions Return if symptoms worsen or fail to improve. Patient Instructions Strep Throat: Care Instructions Your Care Instructions Strep throat is a bacterial infection that causes sudden, severe sore throat and fever. Strep throat, which is caused by bacteria called streptococcus, is treated with antibiotics. Sometimes a strep test is necessary to tell if the sore throat is caused by strep bacteria. Treatment can help ease symptoms and may prevent future problems. Follow-up care is a key part of your treatment and safety. Be sure to make and go to all appointments, and call your doctor if you are having problems. It's also a good idea to know your test results and keep a list of the medicines you take. How can you care for yourself at home? · Take your antibiotics as directed. Do not stop taking them just because you feel better. You need to take the full course of antibiotics. · Strep throat can spread to others until 24 hours after you begin taking antibiotics. During this time, you should avoid contact with other people at work or home, especially infants and children. Do not sneeze or cough on others, and wash your hands often. Keep your drinking glass and eating utensils separate from those of others, and wash these items well in hot, soapy water. · Gargle with warm salt water at least once each hour to help reduce swelling and make your throat feel better. Use 1 teaspoon of salt mixed in 8 fluid ounces of warm water. · Take an over-the-counter pain medication, such as acetaminophen (Tylenol), ibuprofen (Advil, Motrin), or naproxen (Aleve). Read and follow all instructions on the label. · Try an over-the-counter anesthetic throat spray or throat lozenges, which may help relieve throat pain. · Drink plenty of fluids. Fluids may help soothe an irritated throat. Hot fluids, such as tea or soup, may help your throat feel better. · Eat soft solids and drink plenty of clear liquids. Flavored ice pops, ice cream, scrambled eggs, sherbet, and gelatin dessert (such as Jell-O) may also soothe the throat. · Get lots of rest. 
· Do not smoke, and avoid secondhand smoke. If you need help quitting, talk to your doctor about stop-smoking programs and medicines. These can increase your chances of quitting for good. · Use a vaporizer or humidifier to add moisture to the air in your bedroom. Follow the directions for cleaning the machine. When should you call for help? Call your doctor now or seek immediate medical care if: 
? · You have a new or higher fever. ? · You have a fever with a stiff neck or severe headache. ? · You have new or worse trouble swallowing. ? · Your sore throat gets much worse on one side. ? · Your pain becomes much worse on one side of your throat. ? Watch closely for changes in your health, and be sure to contact your doctor if: 
? · You are not getting better after 2 days (48 hours). ? · You do not get better as expected. Where can you learn more? Go to http://brittany-randa.info/. Enter K625 in the search box to learn more about \"Strep Throat: Care Instructions. \" Current as of: May 12, 2017 Content Version: 11.4 © 5948-8841 [employer]. Care instructions adapted under license by [employer] (which disclaims liability or warranty for this information). If you have questions about a medical condition or this instruction, always ask your healthcare professional. [address] any warranty or liability for your use of this information. Introducing [facility]! [employer] introduces Phase Eight patient portal. Now you can access parts of your medical record, email your doctor's office, and request medication refills online. 1. In your internet browser, go to [email or web address] 2. Click on the First Time User? Click Here link in the Sign In box. You will see the New Member Sign Up page. 3. Enter your Phase Eight Access Code exactly as it appears below. You will not need to use this code after youve completed the sign-up process. If you do not sign up before the expiration date, you must request a new code. · Phase Eight Access Code: [email or web address] Expires: 6/4/2018  2:12 PM 
 
4. Enter the last four digits of your Social Security Number (xxxx) and Date of Birth (mm/dd/yyyy) as indicated and click Submit. You will be taken to the next sign-up page. 5. Create a Phase Eight ID. This will be your Phase Eight login ID and cannot be changed, so think of one that is secure and easy to remember. 6. Create a Phase Eight password. You can change your password at any time. 7. Enter your Password Reset Question and Answer. This can be used at a later time if you forget your password. 8. Enter your e-mail address. You will receive e-mail notification when new information is available in [address] 9. Click Sign Up. You can now view and download portions of your medical record. 10. Click the Download Summary menu link to download a portable copy of your medical information. If you have questions, please visit the Frequently Asked Questions section of the Phase Eight website. Remember, Phase Eight is NOT to be used for urgent needs. For medical emergencies, dial 911. Now available from your iPhone and Android! Please provide this summary of care documentation to your next provider. Your primary care clinician is listed as NONE. If you have any questions after today's visit, please call [phone].

## 2018-04-11 NOTE — PATIENT INSTRUCTIONS
Strep Throat: Care Instructions  Your Care Instructions    Strep throat is a bacterial infection that causes sudden, severe sore throat and fever. Strep throat, which is caused by bacteria called streptococcus, is treated with antibiotics. Sometimes a strep test is necessary to tell if the sore throat is caused by strep bacteria. Treatment can help ease symptoms and may prevent future problems. Follow-up care is a key part of your treatment and safety. Be sure to make and go to all appointments, and call your doctor if you are having problems. It's also a good idea to know your test results and keep a list of the medicines you take. How can you care for yourself at home? · Take your antibiotics as directed. Do not stop taking them just because you feel better. You need to take the full course of antibiotics. · Strep throat can spread to others until 24 hours after you begin taking antibiotics. During this time, you should avoid contact with other people at work or home, especially infants and children. Do not sneeze or cough on others, and wash your hands often. Keep your drinking glass and eating utensils separate from those of others, and wash these items well in hot, soapy water. · Gargle with warm salt water at least once each hour to help reduce swelling and make your throat feel better. Use 1 teaspoon of salt mixed in 8 fluid ounces of warm water. · Take an over-the-counter pain medication, such as acetaminophen (Tylenol), ibuprofen (Advil, Motrin), or naproxen (Aleve). Read and follow all instructions on the label. · Try an over-the-counter anesthetic throat spray or throat lozenges, which may help relieve throat pain. · Drink plenty of fluids. Fluids may help soothe an irritated throat. Hot fluids, such as tea or soup, may help your throat feel better. · Eat soft solids and drink plenty of clear liquids.  Flavored ice pops, ice cream, scrambled eggs, sherbet, and gelatin dessert (such as Jell-O) may also soothe the throat. · Get lots of rest.  · Do not smoke, and avoid secondhand smoke. If you need help quitting, talk to your doctor about stop-smoking programs and medicines. These can increase your chances of quitting for good. · Use a vaporizer or humidifier to add moisture to the air in your bedroom. Follow the directions for cleaning the machine. When should you call for help? Call your doctor now or seek immediate medical care if:  ? · You have a new or higher fever. ? · You have a fever with a stiff neck or severe headache. ? · You have new or worse trouble swallowing. ? · Your sore throat gets much worse on one side. ? · Your pain becomes much worse on one side of your throat. ? Watch closely for changes in your health, and be sure to contact your doctor if:  ? · You are not getting better after 2 days (48 hours). ? · You do not get better as expected. Where can you learn more? Go to http://brittany-randa.info/. Enter K625 in the search box to learn more about \"Strep Throat: Care Instructions. \"  Current as of: May 12, 2017  Content Version: 11.4  © 5612-2758 [employer]wise[employer]. Care instructions adapted under license by [employer] (which disclaims liability or warranty for this information). If you have questions about a medical condition or this instruction, always ask your healthcare professional. [address] any warranty or liability for your use of this information.

## 2018-04-11 NOTE — LETTER
NOTIFICATION RETURN TO WORK / SCHOOL 
 
4/11/2018 12:08 PM 
 
Ms. Thomas Mejia [address] [address] To Whom It May Concern: 
 
Thomas Mejia is currently under the care of Sheng Porter. She will return to work/school on: 4/13/18 If there are questions or concerns please have the patient contact our office.  
 
 
 
Sincerely, 
 
 
Lan Parada NP

## 2018-04-11 NOTE — PROGRESS NOTES
HISTORY OF PRESENT ILLNESS  Lourdes Villafana is a 45 y.o. female. HPI Comments: Acute care visit with c/o sore throat, headaches, non productive cough and congestion for more than 2 days. Denies any fever or recent exposure to strep. She has tried OTC theraflu with no relief. Sore Throat    The history is provided by the patient. This is a new problem. The current episode started yesterday. The problem has not changed since onset. There has been no fever. Associated symptoms include congestion, ear pain, headaches, trouble swallowing and cough. She has had no exposure to strep or mono. Treatments tried: Theraflu. The treatment provided no relief. Headache   The history is provided by the patient. This is a new problem. The current episode started yesterday. The problem has not changed since onset. Associated symptoms include headaches. She has tried nothing for the symptoms. Ear Pain   The history is provided by the patient. This is a new problem. The current episode started yesterday. Associated symptoms include headaches. She has tried nothing for the symptoms. Fatigue   The history is provided by the patient. This is a new problem. The current episode started 2 days ago. The problem occurs daily. The problem has not changed since onset. Associated symptoms include headaches. She has tried nothing for the symptoms. Review of Systems   Constitutional: Positive for fatigue. HENT: Positive for congestion, ear pain, sore throat and trouble swallowing. Eyes: Negative. Respiratory: Positive for cough. Negative for sputum production. Cardiovascular: Negative. Gastrointestinal: Negative. Genitourinary: Negative. Neurological: Positive for headaches. Endo/Heme/Allergies: Negative. No past medical history on file. No past surgical history on file.   Current Outpatient Prescriptions on File Prior to Visit   Medication Sig Dispense Refill    triamcinolone acetonide (KENALOG) 0.1 % ointment Apply  to affected area two (2) times a day. use thin layer 30 g 2    levonorgestrel (MIRENA) 20 mcg/24 hr (5 years) IUD 1 Each by IntraUTERine route once. No current facility-administered medications on file prior to visit. Allergies and Intolerances: Allergies   Allergen Reactions    Nitrile Itching and Swelling     Gloves- bumps, peeling of skin       Family History:   Family History   Problem Relation Age of Onset    Diabetes Mother     Diabetes Father     Hypertension Father        Social History:   She  reports that she has quit smoking. She has never used smokeless tobacco. She  reports that she does not drink alcohol. Vitals:   Visit Vitals    /71    Pulse 92    Temp 98.2 °F (36.8 °C)    Resp 16    Ht 5' 7\" (1.702 m)    Wt 221 lb (100.2 kg)    SpO2 98%    BMI 34.61 kg/m2     Body surface area is 2.18 meters squared. Recent Results (from the past 12 hour(s))   AMB POC RAPID STREP A    Collection Time: 04/11/18 12:00 PM   Result Value Ref Range    VALID INTERNAL CONTROL POC Yes     Group A Strep Ag Positive Negative       Physical Exam   Constitutional: She is oriented to person, place, and time. She appears well-developed and well-nourished. HENT:   Head: Atraumatic. Right Ear: External ear normal.   Left Ear: External ear normal.   Nose: Nose normal.   Mouth/Throat: Oropharynx is clear and moist.   Neck: Normal range of motion. Cardiovascular: Normal rate. Pulmonary/Chest: Effort normal.   Lymphadenopathy:        Head (left side): Tonsillar adenopathy present. Neurological: She is alert and oriented to person, place, and time. Psychiatric: She has a normal mood and affect. Her behavior is normal.   Nursing note and vitals reviewed. ASSESSMENT and PLAN    ICD-10-CM ICD-9-CM    1. Sore throat J02.9 462 AMB POC RAPID STREP A   2.  Strep throat J02.0 034.0 amoxicillin (AMOXIL) 500 mg capsule     Follow-up Disposition:  Return if symptoms worsen or fail to improve.  lab results and schedule of future lab studies reviewed with patient  reviewed medications and side effects in detail    - Alarm signals discussed. ER precautions  - Plan of care reviewed with patient. Understanding verbalized and they are in agreement with plan of care.

## 2020-01-23 ENCOUNTER — HOSPITAL ENCOUNTER (EMERGENCY)
Age: 40
Discharge: HOME | End: 2020-01-23
Attending: EMERGENCY MEDICINE
Payer: SELF-PAY

## 2020-01-23 VITALS
HEART RATE: 86 BPM | BODY MASS INDEX: 32.96 KG/M2 | TEMPERATURE: 98.8 F | RESPIRATION RATE: 12 BRPM | WEIGHT: 210 LBS | DIASTOLIC BLOOD PRESSURE: 91 MMHG | SYSTOLIC BLOOD PRESSURE: 133 MMHG | OXYGEN SATURATION: 99 % | HEIGHT: 67 IN

## 2020-01-23 DIAGNOSIS — J11.1 INFLUENZA-LIKE ILLNESS: Primary | ICD-10-CM

## 2020-01-23 PROCEDURE — 99281 EMR DPT VST MAYX REQ PHY/QHP: CPT

## 2020-01-23 RX ORDER — OSELTAMIVIR PHOSPHATE 75 MG/1
75 CAPSULE ORAL 2 TIMES DAILY
Qty: 10 CAP | Refills: 0 | Status: SHIPPED | OUTPATIENT
Start: 2020-01-23 | End: 2020-01-28

## 2020-01-23 NOTE — ED TRIAGE NOTES
The patient presents for evaluation of generalized body ache, productive cough, ear pain, sore throat and fever that began Tuesday.

## 2020-01-23 NOTE — ED PROVIDER NOTES
EMERGENCY DEPARTMENT HISTORY AND PHYSICAL EXAM    Date: 1/23/2020  Patient Name: Leopoldo Mallow    History of Presenting Illness     Chief Complaint   Patient presents with    Flu Like Symptoms       HPI: Leopoldo Mallow, 36 y.o. female presents to the ED complains of fever, sweats, fatigue starting 2 nights ago. She c/o bilat ear pain, sore throat, cough worse at night. She c/o myalgias. Pt tried Tylenol PM.  She states her son was sick and then she became sick. Patient did not receive a flu shot this season. Patient still able to eat and drink normally. There are no other complaints, changes, or physical findings at this time. Past History     Past Medical History:  History reviewed. No pertinent past medical history. Past Surgical History:  History reviewed. No pertinent surgical history. Family History:  Family History   Problem Relation Age of Onset    Diabetes Mother    [facility] Diabetes Father     Hypertension Father        Social History:  Social History     Tobacco Use    Smoking status: Former Smoker    Smokeless tobacco: Never Used   Substance Use Topics    Alcohol use: No     Alcohol/week: 0.0 standard drinks    Drug use: Yes     Types: Marijuana       Allergies: Allergies   Allergen Reactions    Nitrile Itching and Swelling     Gloves- bumps, peeling of skin         Review of Systems   Constitutional: Positive for chills, fatigue and fever. Negative for appetite change. HENT: Positive for ear pain and sore throat. Negative for congestion, ear discharge and rhinorrhea. Eyes: Negative for visual disturbance. Respiratory: Positive for cough. Negative for shortness of breath. Cardiovascular: Negative for chest pain and palpitations. Gastrointestinal: Negative for abdominal pain, nausea and vomiting. Genitourinary: Negative. Musculoskeletal: Positive for myalgias. Negative for back pain. Skin: Negative. Allergic/Immunologic: Negative.     Neurological: Negative for dizziness, weakness, numbness and headaches. Physical Exam  Vitals signs and nursing note reviewed. Constitutional:       General: She is awake. She is not in acute distress. Appearance: Normal appearance. She is well-developed. She is ill-appearing. She is not toxic-appearing or diaphoretic. HENT:      Head: Normocephalic and atraumatic. Right Ear: Tympanic membrane, ear canal and external ear normal.      Left Ear: Tympanic membrane, ear canal and external ear normal.      Nose: Nose normal.      Mouth/Throat:      Mouth: Mucous membranes are moist.      Pharynx: Uvula midline. Posterior oropharyngeal erythema present. No uvula swelling. Comments: Posterior oropharynx erythematous  Eyes:      General: Lids are normal. No scleral icterus. Conjunctiva/sclera: Conjunctivae normal.   Neck:      Musculoskeletal: Normal range of motion and neck supple. Cardiovascular:      Rate and Rhythm: Normal rate and regular rhythm. Heart sounds: Normal heart sounds. Pulmonary:      Effort: Pulmonary effort is normal. No respiratory distress. Breath sounds: Normal breath sounds. Musculoskeletal: Normal range of motion. Lymphadenopathy:      Cervical: No cervical adenopathy. Skin:     General: Skin is warm. Findings: No rash. Neurological:      Mental Status: She is alert and oriented to person, place, and time. Motor: No abnormal muscle tone. Psychiatric:         Speech: Speech normal.         Behavior: Behavior normal. Behavior is cooperative. Thought Content: Thought content normal.         Judgment: Judgment normal.          Diagnostic Study Results     Labs -   No results found for this or any previous visit (from the past 12 hour(s)). Radiologic Studies -   No orders to display     CT Results  (Last 48 hours)    None        CXR Results  (Last 48 hours)    None          Vital Signs-Reviewed the patient's vital signs.   Patient Vitals for the past 12 hrs:   Temp Pulse Resp BP SpO2   01/23/20 1235 98.8 °F (37.1 °C) 86 12 (!) 133/91 99 %       I reviewed the vital signs, available nursing notes, past medical history, past surgical history, family history and social history. Provider Notes (Medical Decision Making):   Fevers, chills, fatigue, myalgias, cough, sore throat, ear pain for less than 2 days. Likely viral illness, URI v. Influenza. Pt still in range for treatment of influenza, will give Rx for Tamiflu. Diagnosis     Clinical Impression:   1. Influenza-like illness        Disposition:  Home    PLAN:  1. Current Discharge Medication List      START taking these medications    Details   oseltamivir (TAMIFLU) 75 mg capsule Take 1 Cap by mouth two (2) times a day for 5 days. Qty: 10 Cap, Refills: 0           2. Follow-up Information     Follow up With Specialties Details Why [address] [facility] EMERGENCY DEPT Emergency Medicine  If symptoms worsen, As needed [address] [place] [address]  Schedule an appointment as soon as possible for a visit in 2 days If symptoms worsen, for re-evaluation, As needed [address] [facility]  [phone]        3. Return to ED if worse   The patient will be discharged home. Warning signs of worsening condition were discussed and the patient verbalized understanding. Based on patient's age, coexisting illness, exam, and the results of this ED evaluation, the decision to treat as an outpatient was made. While it is impossible to completely exclude the possibility of underlying serious disease or worsening of condition, I feel the relative likelihood is extremely low. I discussed this uncertainty with the patient, who understood ED evaluation and treatment and felt comfortable with the outpatient treatment plan. All questions regarding care, test results, and follow up were answered. The patient is stable and appropriate to discharge.  Patient understands importance to return to the emergency department for any new or worsening symptoms. I stressed the importance of follow up for repeat assessment and possibly further evaluation/treatment.         Ayse Cardona PA-C

## 2020-01-23 NOTE — LETTER
[facility] EMERGENCY DEPT 
[address] [facility] [id number] [phone] Work/School Note Date: 1/23/2020 To Whom It May concern: 
 
Corinna Obrien was seen and treated today in the emergency room by the following provider(s): 
Attending Provider: Ian Carlos DO Physician Assistant: Ida Roe PA-C. Corinna Obrien may return to work on 1/26/20.  
 
Sincerely, 
 
 
 
 
Kassy Clarke PA-C

## 2020-01-23 NOTE — DISCHARGE INSTRUCTIONS
Patient Education      Wash your hands frequently, do not share food or drinks with others. Rest, drink plenty of water. check your temperature and if it is above 100, take Tylenol or Ibuprofen for fever. Return as needed or if symptoms worsen. Coping With an Acute Illness: Care Instructions  Your Care Instructions    Finding out that you have a sudden (acute) illness can be very hard. Getting sick can be scary. It also can be very expensive and can disrupt your life. The most important thing is to deal with the illness and get healthy again. If work, school, or other activities have to be put on hold while you get treatment, that is okay. Do what you have to do to get better, and then you can focus on the rest of your life. Some acute illnesses can become chronic, which means that they last for a long time. Although this may not happen with you, it is best to be prepared for this and to know how to handle it. Talk to your doctor to find out as much as you can about the illness and how best to treat it. Follow-up care is a key part of your treatment and safety. Be sure to make and go to all appointments, and call your doctor if you are having problems. It's also a good idea to know your test results and keep a list of the medicines you take. How can you care for yourself at home? Treating the illness  · Take your medicines exactly as prescribed. Call your doctor if you think you are having a problem with your medicine. You will get more details on the specific medicines your doctor prescribes. · Take pain medicines exactly as directed. ? If the doctor gave you a prescription medicine for pain, take it as prescribed. ? If you are not taking a prescription pain medicine, ask your doctor if you can use an over-the-counter medicine. · Tell your doctor about any over-the-counter medicines you take. These medicines can cause problems when taken with other medicines. · Keep in touch with your doctor.  The more informed you keep your doctor, the better he or she will be able to care for you. · Get plenty of rest. Talk with your doctor if you have trouble sleeping because of pain. · Eat a balanced diet. Talk with your doctor about what type of diet may be best.  · Do not smoke. Smoking or being around smoke can make the condition worse. If you need help quitting, talk to your doctor about stop-smoking programs and medicines. These can increase your chances of quitting for good. Keeping your life in order  Here are some tips on how to keep your life on track while you get better. · Talk with your insurance provider to make sure treatments are covered. · Make sure your employer knows about the illness. Get work commitments taken care of or postponed. This will give you the time and energy you need to treat the illness. · Keep your employer informed about when you will be able to return. · Ask for help from friends and family to keep your home in order. Keep your surroundings clean and in good shape to help reduce stress. Get help so you can save your energy for treating the illness. Questions to ask  · Can the illness be cured? Can the illness become long-lasting (chronic)? · How will my lifestyle change once the illness is gone? · Does the illness run in families? · What types of treatment are available? Which treatment has the best success rate? · Are there any side effects? · What are the best and worst possible results of the treatment? When should you call for help? Watch closely for changes in your health, and be sure to contact your doctor if:    · You do not get better as expected. Where can you learn more? Go to http://brittany-randa.info/. Enter [id number] in the search box to learn more about \"Coping With an Acute Illness: Care Instructions. \"  Current as of: April 7, 2019  Content Version: 12.2  © 9485-2820 [employer].  Care instructions adapted under license by [employer] (which disclaims liability or warranty for this information). If you have questions about a medical condition or this instruction, always ask your healthcare professional. [address] any warranty or liability for your use of this information.

## 2024-11-28 ENCOUNTER — HOSPITAL ENCOUNTER (EMERGENCY)
Facility: HOSPITAL | Age: 44
Discharge: HOME | End: 2024-11-28
Attending: EMERGENCY MEDICINE

## 2024-11-28 VITALS
BODY MASS INDEX: 28.63 KG/M2 | OXYGEN SATURATION: 100 % | HEART RATE: 86 BPM | SYSTOLIC BLOOD PRESSURE: 153 MMHG | TEMPERATURE: 98.4 F | WEIGHT: 200 LBS | RESPIRATION RATE: 20 BRPM | HEIGHT: 70 IN | DIASTOLIC BLOOD PRESSURE: 104 MMHG

## 2024-11-28 DIAGNOSIS — M54.31 SCIATICA OF RIGHT SIDE: Primary | ICD-10-CM

## 2024-11-28 PROCEDURE — 99283 EMERGENCY DEPT VISIT LOW MDM: CPT

## 2024-11-28 PROCEDURE — 6370000000 HC RX 637 (ALT 250 FOR IP): Performed by: EMERGENCY MEDICINE

## 2024-11-28 RX ORDER — IBUPROFEN 400 MG/1
800 TABLET, FILM COATED ORAL
Status: COMPLETED | OUTPATIENT
Start: 2024-11-28 | End: 2024-11-28

## 2024-11-28 RX ORDER — IBUPROFEN 600 MG/1
600 TABLET, FILM COATED ORAL EVERY 8 HOURS
Qty: 9 TABLET | Refills: 0 | Status: SHIPPED | OUTPATIENT
Start: 2024-11-28 | End: 2024-12-01

## 2024-11-28 RX ORDER — LIDOCAINE 4 G/G
1 PATCH TOPICAL
Status: DISCONTINUED | OUTPATIENT
Start: 2024-11-28 | End: 2024-11-28 | Stop reason: HOSPADM

## 2024-11-28 RX ADMIN — IBUPROFEN 800 MG: 400 TABLET, FILM COATED ORAL at 05:34

## 2024-11-28 ASSESSMENT — PAIN DESCRIPTION - DESCRIPTORS
DESCRIPTORS: SHARP
DESCRIPTORS: SHOOTING

## 2024-11-28 ASSESSMENT — PAIN SCALES - GENERAL
PAINLEVEL_OUTOF10: 10
PAINLEVEL_OUTOF10: 10

## 2024-11-28 ASSESSMENT — PAIN DESCRIPTION - LOCATION
LOCATION: BACK;LEG
LOCATION: LEG

## 2024-11-28 ASSESSMENT — PAIN DESCRIPTION - ORIENTATION
ORIENTATION: RIGHT
ORIENTATION: RIGHT

## 2024-11-28 NOTE — ED NOTES
Pt voiced pain 10/10 at this time. Pt awake, alert and orientated. Allergies verified. Medicated as per MAR. Resperations even and unlabored. Pt to be discharged at this time. Pt aware and agrees with plan of care.

## 2024-11-28 NOTE — ED TRIAGE NOTES
Pt arrived to ED with sharp pain shooting from buttock down to back of ankle for two days. Has tried tylenol and lidocaine patches and nothing is working. Ambulatory.

## 2024-11-28 NOTE — ED PROVIDER NOTES
vital signs, available nursing notes, past medical history, past surgical history, family history and social history.    Patient Vitals for the past 12 hrs:   Temp Pulse Resp BP SpO2   11/28/24 0500 98.4 °F (36.9 °C) 86 20 (!) 153/104 100 %       Vital Signs-Reviewed the patient's vital signs.    Pulse Oximetry Analysis, Cardiac Monitor, 12 lead ekg:      Interpreted by the EP.      Records Reviewed: Nursing notes reviewed (Time of Review: 5:21 AM)    Procedures:   Critical Care Time: 0  If critical care time is note it is exclusive of any separately billable procedures.     Aspirin: (was aspirin given for stroke?)    Diagnosis     Disposition: DISPOSITION Decision To Discharge 11/28/2024 05:21:24 AM            DISCHARGE MEDICATIONS:  Current Discharge Medication List             Details   ibuprofen (ADVIL;MOTRIN) 600 MG tablet Take 1 tablet by mouth every 8 (eight) hours for 3 days  Qty: 9 tablet, Refills: 0                Details   ondansetron (ZOFRAN) 8 MG tablet Take 8 mg by mouth every 8 hours as needed             DISCONTINUED MEDICATIONS:  Current Discharge Medication List          PATIENT REFERRED TO:  Follow Up with:  No follow-up provider specified.  _______________________________    Clinical Impression:   1. Sciatica of right side         Jose Miguel Grullon MD using Dragon dictation      _______________________________     Jose Miguel Grullon MD  11/28/24 0549     Self

## 2024-12-03 ENCOUNTER — HOSPITAL ENCOUNTER (EMERGENCY)
Facility: HOSPITAL | Age: 44
Discharge: HOME | End: 2024-12-03

## 2024-12-03 VITALS
OXYGEN SATURATION: 99 % | BODY MASS INDEX: 28.63 KG/M2 | TEMPERATURE: 98.1 F | SYSTOLIC BLOOD PRESSURE: 141 MMHG | HEIGHT: 70 IN | DIASTOLIC BLOOD PRESSURE: 95 MMHG | RESPIRATION RATE: 16 BRPM | WEIGHT: 200 LBS | HEART RATE: 87 BPM

## 2024-12-03 DIAGNOSIS — M54.31 SCIATICA OF RIGHT SIDE: Primary | ICD-10-CM

## 2024-12-03 PROCEDURE — 99283 EMERGENCY DEPT VISIT LOW MDM: CPT

## 2024-12-03 RX ORDER — PREDNISONE 10 MG/1
TABLET ORAL
Qty: 21 EACH | Refills: 0 | Status: SHIPPED | OUTPATIENT
Start: 2024-12-03

## 2024-12-03 RX ORDER — LIDOCAINE 50 MG/G
1 PATCH TOPICAL DAILY
Qty: 30 PATCH | Refills: 0 | Status: SHIPPED | OUTPATIENT
Start: 2024-12-03 | End: 2025-01-02

## 2024-12-03 RX ORDER — METHOCARBAMOL 750 MG/1
750 TABLET, FILM COATED ORAL EVERY 6 HOURS PRN
Qty: 20 TABLET | Refills: 0 | Status: SHIPPED | OUTPATIENT
Start: 2024-12-03 | End: 2024-12-13

## 2024-12-03 ASSESSMENT — ENCOUNTER SYMPTOMS
SHORTNESS OF BREATH: 0
COUGH: 0
EYE DISCHARGE: 0
STRIDOR: 0
WHEEZING: 0
BACK PAIN: 1
RHINORRHEA: 0
ABDOMINAL PAIN: 0
NAUSEA: 0
EYE REDNESS: 0
SORE THROAT: 0
VOMITING: 0

## 2024-12-03 ASSESSMENT — PAIN DESCRIPTION - LOCATION: LOCATION: BACK;LEG

## 2024-12-03 ASSESSMENT — PAIN DESCRIPTION - PAIN TYPE: TYPE: ACUTE PAIN

## 2024-12-03 ASSESSMENT — PAIN DESCRIPTION - ORIENTATION: ORIENTATION: RIGHT

## 2024-12-03 ASSESSMENT — PAIN - FUNCTIONAL ASSESSMENT: PAIN_FUNCTIONAL_ASSESSMENT: 0-10

## 2024-12-03 ASSESSMENT — PAIN SCALES - GENERAL: PAINLEVEL_OUTOF10: 6

## 2024-12-03 ASSESSMENT — PAIN DESCRIPTION - DESCRIPTORS: DESCRIPTORS: SHARP;SHOOTING

## 2024-12-03 NOTE — ED PROVIDER NOTES
EMERGENCY DEPARTMENT HISTORY AND PHYSICAL EXAM    Date: 12/3/2024  Patient Name: Dorcas Adler    History of Presenting Illness     Chief Complaint   Patient presents with    Lower back pain radiating to right leg         History Provided By: patient     Chief Complaint: back pain   Duration: 2 weeks   Timing:  acute  Location: low back into RLE   Quality:shooting  Severity: moderate  Modifying Factors: motrin has minimally helped  Associated Symptoms: none      Additional History (Context): Dorcas Adler is a 44 y.o. female with no documented PMH who presents with c/o continued R sided low back pain radiating down the R leg. Pain is minimally alleviated by motrin and worsened with movement/position changes. Pt was seen in the ED on Thanksgiving day for the same thing. Prescribed motrin with little improvement. No other complaints reported.     PCP: No primary care provider on file.    No current facility-administered medications for this encounter.     Current Outpatient Medications   Medication Sig Dispense Refill    predniSONE 10 MG (21) TBPK Use as directed 21 each 0    methocarbamol (ROBAXIN-750) 750 MG tablet Take 1 tablet by mouth every 6 hours as needed (muscle pain) 20 tablet 0    lidocaine (LIDODERM) 5 % Place 1 patch onto the skin daily 12 hours on, 12 hours off. 30 patch 0    ibuprofen (ADVIL;MOTRIN) 600 MG tablet Take 1 tablet by mouth every 8 (eight) hours for 3 days 9 tablet 0    ondansetron (ZOFRAN) 8 MG tablet Take 8 mg by mouth every 8 hours as needed         Past History     Past Medical History:  No past medical history on file.    Past Surgical History:  Past Surgical History:   Procedure Laterality Date    OTHER SURGICAL HISTORY  2017    IUD placed        Family History:  Family History   Problem Relation Age of Onset    Diabetes Father     Hypertension Father     Diabetes Mother        Social History:  Social History     Tobacco Use    Smoking status: Former    Smokeless tobacco: Never

## 2024-12-03 NOTE — ED TRIAGE NOTES
Pt came ambulatory to triage c/o lower back pain radiating to her right leg. Pt was seen in this ED on Thanksgiving for the same symptoms and was prescribed with Ibuprofen with minimal relief.